# Patient Record
Sex: MALE | Race: WHITE | NOT HISPANIC OR LATINO
[De-identification: names, ages, dates, MRNs, and addresses within clinical notes are randomized per-mention and may not be internally consistent; named-entity substitution may affect disease eponyms.]

---

## 2017-03-27 ENCOUNTER — APPOINTMENT (OUTPATIENT)
Dept: OTOLARYNGOLOGY | Facility: CLINIC | Age: 67
End: 2017-03-27

## 2017-04-04 ENCOUNTER — APPOINTMENT (OUTPATIENT)
Dept: OTOLARYNGOLOGY | Facility: CLINIC | Age: 67
End: 2017-04-04

## 2017-04-04 VITALS — BODY MASS INDEX: 21.67 KG/M2 | WEIGHT: 160 LBS | HEIGHT: 72 IN

## 2017-04-04 DIAGNOSIS — H92.03 OTALGIA, BILATERAL: ICD-10-CM

## 2017-04-04 DIAGNOSIS — Z86.69 PERSONAL HISTORY OF OTHER DISEASES OF THE NERVOUS SYSTEM AND SENSE ORGANS: ICD-10-CM

## 2017-04-04 DIAGNOSIS — H93.12 TINNITUS, LEFT EAR: ICD-10-CM

## 2017-04-04 DIAGNOSIS — H92.09 OTALGIA, UNSPECIFIED EAR: ICD-10-CM

## 2017-04-04 DIAGNOSIS — M26.609 UNSPECIFIED TEMPOROMANDIBULAR JOINT DISORDER: ICD-10-CM

## 2017-04-04 DIAGNOSIS — H90.3 SENSORINEURAL HEARING LOSS, BILATERAL: ICD-10-CM

## 2017-04-04 DIAGNOSIS — K11.9 DISEASE OF SALIVARY GLAND, UNSPECIFIED: ICD-10-CM

## 2017-04-04 RX ORDER — GABAPENTIN 300 MG/1
300 CAPSULE ORAL
Qty: 90 | Refills: 2 | Status: ACTIVE | COMMUNITY
Start: 2017-04-04 | End: 1900-01-01

## 2017-04-26 ENCOUNTER — APPOINTMENT (OUTPATIENT)
Dept: MRI IMAGING | Facility: CLINIC | Age: 67
End: 2017-04-26

## 2017-04-28 DIAGNOSIS — Z82.49 FAMILY HISTORY OF ISCHEMIC HEART DISEASE AND OTHER DISEASES OF THE CIRCULATORY SYSTEM: ICD-10-CM

## 2017-06-28 PROBLEM — Z82.49 FAMILY HISTORY OF HYPERTENSION: Status: ACTIVE | Noted: 2017-04-04

## 2020-07-09 ENCOUNTER — APPOINTMENT (OUTPATIENT)
Dept: OTOLARYNGOLOGY | Facility: CLINIC | Age: 70
End: 2020-07-09
Payer: MEDICAID

## 2020-07-09 VITALS
DIASTOLIC BLOOD PRESSURE: 83 MMHG | HEART RATE: 89 BPM | SYSTOLIC BLOOD PRESSURE: 127 MMHG | TEMPERATURE: 98.3 F | OXYGEN SATURATION: 99 %

## 2020-07-09 DIAGNOSIS — H93.8X2 OTHER SPECIFIED DISORDERS OF LEFT EAR: ICD-10-CM

## 2020-07-09 DIAGNOSIS — H92.02 OTALGIA, LEFT EAR: ICD-10-CM

## 2020-07-09 DIAGNOSIS — H61.23 IMPACTED CERUMEN, BILATERAL: ICD-10-CM

## 2020-07-09 PROCEDURE — 69210 REMOVE IMPACTED EAR WAX UNI: CPT

## 2020-07-09 PROCEDURE — 99214 OFFICE O/P EST MOD 30 MIN: CPT | Mod: 25

## 2020-07-09 RX ORDER — NEOMYCIN SULFATE, POLYMYXIN B SULFATE AND HYDROCORTISONE 3.5; 10000; 1 MG/ML; [IU]/ML; MG/ML
3.5-10000-1 SOLUTION AURICULAR (OTIC) 4 TIMES DAILY
Qty: 2 | Refills: 10 | Status: ACTIVE | COMMUNITY
Start: 2020-07-09 | End: 1900-01-01

## 2020-07-09 NOTE — HISTORY OF PRESENT ILLNESS
[de-identified] : 65 years old male patient with history of Left ear discomfort, Otalgia and clogged ear for the past couple of months.   TMJ  Patient is present today in the office with Bilateral  Cerumen Impaction, Left Otitis Externa

## 2020-07-09 NOTE — PHYSICAL EXAM
[de-identified] : Lt facial nerve weakness [de-identified] : Lt facial nerve weakness [de-identified] :  Bilateral  Cerumen Impaction, Left Otitis Externa  [Normal] : mucosa is normal [Midline] : trachea located in midline position [de-identified] : Lt facial nerve weakness

## 2020-07-09 NOTE — PROCEDURE
[] : Removal of Cerumen [FreeTextEntry5] : Cerumen impaction was removed via operating head otoscope.  Mcneill suction # 5 and ear curette

## 2020-07-09 NOTE — REVIEW OF SYSTEMS
[Patient Intake Form Reviewed] : Patient intake form was reviewed [As Noted in HPI] : as noted in HPI [Ear Pain] : ear pain [Hearing Loss] : hearing loss [Ear Noises] : ear noises [Negative] : Heme/Lymph

## 2020-07-09 NOTE — REASON FOR VISIT
[Subsequent Evaluation] : a subsequent evaluation for [FreeTextEntry2] : Left ear discomfort, Otalgia and clogged ear for the past couple of months.

## 2023-08-04 ENCOUNTER — NON-APPOINTMENT (OUTPATIENT)
Age: 73
End: 2023-08-04

## 2023-08-15 ENCOUNTER — APPOINTMENT (OUTPATIENT)
Dept: UROLOGY | Facility: CLINIC | Age: 73
End: 2023-08-15
Payer: MEDICARE

## 2023-08-15 VITALS
OXYGEN SATURATION: 97 % | HEART RATE: 92 BPM | BODY MASS INDEX: 21.7 KG/M2 | TEMPERATURE: 97.7 F | SYSTOLIC BLOOD PRESSURE: 130 MMHG | WEIGHT: 160 LBS | DIASTOLIC BLOOD PRESSURE: 73 MMHG

## 2023-08-15 DIAGNOSIS — R10.9 UNSPECIFIED ABDOMINAL PAIN: ICD-10-CM

## 2023-08-15 DIAGNOSIS — R39.9 UNSPECIFIED SYMPTOMS AND SIGNS INVOLVING THE GENITOURINARY SYSTEM: ICD-10-CM

## 2023-08-15 DIAGNOSIS — R35.0 FREQUENCY OF MICTURITION: ICD-10-CM

## 2023-08-15 LAB
BILIRUB UR QL STRIP: NORMAL
CLARITY UR: CLEAR
COLLECTION METHOD: NORMAL
GLUCOSE UR-MCNC: NORMAL
HCG UR QL: 0.2 EU/DL
HGB UR QL STRIP.AUTO: NORMAL
KETONES UR-MCNC: NORMAL
LEUKOCYTE ESTERASE UR QL STRIP: ABNORMAL
NITRITE UR QL STRIP: NORMAL
PH UR STRIP: 6.5
PROT UR STRIP-MCNC: NORMAL
SP GR UR STRIP: 1.02

## 2023-08-15 PROCEDURE — 99204 OFFICE O/P NEW MOD 45 MIN: CPT | Mod: 25

## 2023-08-15 PROCEDURE — 51798 US URINE CAPACITY MEASURE: CPT

## 2023-08-15 PROCEDURE — 81003 URINALYSIS AUTO W/O SCOPE: CPT | Mod: QW

## 2023-08-15 RX ORDER — TAMSULOSIN HYDROCHLORIDE 0.4 MG/1
0.4 CAPSULE ORAL
Refills: 0 | Status: ACTIVE | COMMUNITY

## 2023-08-15 RX ORDER — OMEPRAZOLE MAGNESIUM 20 MG/1
TABLET, DELAYED RELEASE ORAL
Refills: 0 | Status: ACTIVE | COMMUNITY

## 2023-08-15 NOTE — PHYSICAL EXAM
[General Appearance - Well Developed] : well developed [General Appearance - Well Nourished] : well nourished [Normal Appearance] : normal appearance [Well Groomed] : well groomed [General Appearance - In No Acute Distress] : no acute distress [Oriented To Time, Place, And Person] : oriented to person, place, and time [Affect] : the affect was normal [Mood] : the mood was normal [Not Anxious] : not anxious [Urethral Meatus] : meatus normal [Urinary Bladder Findings] : the bladder was normal on palpation [Scrotum] : the scrotum was normal [Testes Mass (___cm)] : there were no testicular masses [Edema] : no peripheral edema [Respiration, Rhythm And Depth] : normal respiratory rhythm and effort [Exaggerated Use Of Accessory Muscles For Inspiration] : no accessory muscle use [Abdomen Soft] : soft [Abdomen Tenderness] : non-tender [Abdomen Mass (___ Cm)] : no abdominal mass palpated [Abdomen Hernia] : no hernia was discovered [Costovertebral Angle Tenderness] : no ~M costovertebral angle tenderness [FreeTextEntry1] : Rectal exam declined today. [Normal Station and Gait] : the gait and station were normal for the patient's age [] : no rash [No Focal Deficits] : no focal deficits

## 2023-08-15 NOTE — ASSESSMENT
[FreeTextEntry1] : We discussed his prior records at length. The etiology of his left abdominal pain is unclear. We discussed the significance of monitoring his PSAs, especially in view of his prior history of abnormal MRI scan. I have asked that he send his prior PSAs for my review. I advised that if he has not had a PSA test this year, he can come to our office to have that drawn after 3 days of abstinence. Regarding his moderate LUTS, he will continue on the tamsulosin 0.4 mg. I re-reviewed the side effects of the medication. A urine culture is pending. We also discussed empiric increase of tamsulosin to 0.8 mg daily and he will consider this. We also discussed further testing  while he is more symptomatic and he will also consider this alternative. Martine Hancock MD

## 2023-08-15 NOTE — HISTORY OF PRESENT ILLNESS
[FreeTextEntry1] : Patient seen TODAY 8/15/2023 as NPT. Approximately 2 years ago, he experienced intermittent left abdominal pain, which continues to persist but has remained stable. A renal US in 10/2021 was unremarkable (see below). He was recently evaluated by another urologist a few months ago, but was lost to follow up.   From his general urologic history, he has moderate LUTS, with nocturia x1-2. He denies dysuria. He has been on tamsulosin 0.4 mg daily, with good response.   Additionally, he has a history of elevated PSAs (no records available). A prostate MRI in 2022 identified a PIRADs 4 lesion. A prostate biopsy at [facility] on 2/22/22, which was negative. His most recent PSA is reportedly normal (no records available)  UA trace WBC IPSS 17 ROBYN 19 JOAN 5 PVR 25 cc  Cre: 3/27/23--0.91  Renal sono: 10/2021--bilateral renal cysts, no stones, prostate 60 cc Prostate biopsy: 2/22/22--negative

## 2023-08-17 ENCOUNTER — TRANSCRIPTION ENCOUNTER (OUTPATIENT)
Age: 73
End: 2023-08-17

## 2023-08-17 ENCOUNTER — NON-APPOINTMENT (OUTPATIENT)
Age: 73
End: 2023-08-17

## 2023-08-17 LAB — BACTERIA UR CULT: NORMAL

## 2023-09-14 ENCOUNTER — NON-APPOINTMENT (OUTPATIENT)
Age: 73
End: 2023-09-14

## 2023-09-14 ENCOUNTER — APPOINTMENT (OUTPATIENT)
Dept: HEART AND VASCULAR | Facility: CLINIC | Age: 73
End: 2023-09-14
Payer: MEDICARE

## 2023-09-14 VITALS
OXYGEN SATURATION: 97 % | SYSTOLIC BLOOD PRESSURE: 122 MMHG | DIASTOLIC BLOOD PRESSURE: 70 MMHG | HEIGHT: 72 IN | HEART RATE: 90 BPM | BODY MASS INDEX: 22.08 KG/M2 | WEIGHT: 163 LBS | TEMPERATURE: 98.2 F

## 2023-09-14 DIAGNOSIS — H53.9 UNSPECIFIED VISUAL DISTURBANCE: ICD-10-CM

## 2023-09-14 PROCEDURE — 36415 COLL VENOUS BLD VENIPUNCTURE: CPT

## 2023-09-14 PROCEDURE — 99204 OFFICE O/P NEW MOD 45 MIN: CPT | Mod: 25

## 2023-09-14 PROCEDURE — 93000 ELECTROCARDIOGRAM COMPLETE: CPT

## 2023-09-14 RX ORDER — AMLODIPINE BESYLATE 5 MG/1
5 TABLET ORAL DAILY
Refills: 0 | Status: ACTIVE | COMMUNITY

## 2023-09-14 RX ORDER — LOSARTAN POTASSIUM 100 MG/1
100 TABLET, FILM COATED ORAL DAILY
Qty: 1 | Refills: 3 | Status: ACTIVE | COMMUNITY
Start: 2023-09-14

## 2023-09-18 ENCOUNTER — TRANSCRIPTION ENCOUNTER (OUTPATIENT)
Age: 73
End: 2023-09-18

## 2023-09-18 DIAGNOSIS — I10 ESSENTIAL (PRIMARY) HYPERTENSION: ICD-10-CM

## 2023-09-18 LAB
ALBUMIN SERPL ELPH-MCNC: 4.9 G/DL
ALP BLD-CCNC: 86 U/L
ALT SERPL-CCNC: 12 U/L
ANION GAP SERPL CALC-SCNC: 11 MMOL/L
AST SERPL-CCNC: 14 U/L
BILIRUB SERPL-MCNC: 1 MG/DL
BUN SERPL-MCNC: 22 MG/DL
CALCIUM SERPL-MCNC: 9.9 MG/DL
CHLORIDE SERPL-SCNC: 106 MMOL/L
CHOLEST SERPL-MCNC: 188 MG/DL
CO2 SERPL-SCNC: 24 MMOL/L
CREAT SERPL-MCNC: 0.88 MG/DL
CREAT SPEC-SCNC: 152 MG/DL
EGFR: 91 ML/MIN/1.73M2
ESTIMATED AVERAGE GLUCOSE: 88 MG/DL
GLUCOSE SERPL-MCNC: 102 MG/DL
HBA1C MFR BLD HPLC: 4.7 %
HDLC SERPL-MCNC: 50 MG/DL
LDLC SERPL CALC-MCNC: 116 MG/DL
MICROALBUMIN 24H UR DL<=1MG/L-MCNC: 1.3 MG/DL
MICROALBUMIN/CREAT 24H UR-RTO: 9 MG/G
NONHDLC SERPL-MCNC: 137 MG/DL
POTASSIUM SERPL-SCNC: 4.7 MMOL/L
PROT SERPL-MCNC: 7.1 G/DL
SODIUM SERPL-SCNC: 141 MMOL/L
TRIGL SERPL-MCNC: 118 MG/DL

## 2023-09-18 RX ORDER — ROSUVASTATIN CALCIUM 10 MG/1
10 TABLET, FILM COATED ORAL
Qty: 90 | Refills: 2 | Status: ACTIVE | COMMUNITY
Start: 2023-09-18 | End: 1900-01-01

## 2023-09-22 ENCOUNTER — TRANSCRIPTION ENCOUNTER (OUTPATIENT)
Age: 73
End: 2023-09-22

## 2023-09-28 ENCOUNTER — APPOINTMENT (OUTPATIENT)
Dept: HEART AND VASCULAR | Facility: CLINIC | Age: 73
End: 2023-09-28
Payer: MEDICARE

## 2023-09-28 PROCEDURE — 93306 TTE W/DOPPLER COMPLETE: CPT

## 2023-10-02 ENCOUNTER — TRANSCRIPTION ENCOUNTER (OUTPATIENT)
Age: 73
End: 2023-10-02

## 2023-10-25 ENCOUNTER — APPOINTMENT (OUTPATIENT)
Dept: HEART AND VASCULAR | Facility: CLINIC | Age: 73
End: 2023-10-25

## 2024-03-19 ENCOUNTER — APPOINTMENT (OUTPATIENT)
Dept: OTOLARYNGOLOGY | Facility: CLINIC | Age: 74
End: 2024-03-19

## 2024-04-03 ENCOUNTER — APPOINTMENT (OUTPATIENT)
Dept: DERMATOLOGY | Facility: CLINIC | Age: 74
End: 2024-04-03
Payer: MEDICARE

## 2024-04-03 DIAGNOSIS — L30.9 DERMATITIS, UNSPECIFIED: ICD-10-CM

## 2024-04-03 PROCEDURE — 17110 DESTRUCTION B9 LES UP TO 14: CPT

## 2024-04-03 PROCEDURE — 99204 OFFICE O/P NEW MOD 45 MIN: CPT | Mod: 25

## 2024-04-03 RX ORDER — TRIAMCINOLONE ACETONIDE 1 MG/G
0.1 CREAM TOPICAL
Qty: 1 | Refills: 1 | Status: ACTIVE | COMMUNITY
Start: 2024-04-03 | End: 1900-01-01

## 2024-04-03 NOTE — ASSESSMENT
[FreeTextEntry1] : #Seborrheic Keratoses, clinically inflamed Natural history and treatment options discussed. Recommend treatment with cryotherapy to which the patient is agreeable.  Cryotherapy Procedure Note. Lesion(s) treated: 3, Location: vertex scalp After obtaining verbal consent, including counseling on risks of dyspigmentation and scarring, liquid nitrogen was applied to the above lesions. The patient tolerated the procedure well. Wound care was reviewed.  #Dermatitis, chronic - active #Possible PIE -moisturizer daily with emollient such as aquaphor or vaseline -triamcinolone 0.1 cream BID for 2 weeks on 1 week off as needed for itching -rtc if not improved after 4-6 weeks. If there is evidence of PIE without inflammation, can consider treating with PDL  RTC prn

## 2024-04-03 NOTE — HISTORY OF PRESENT ILLNESS
[de-identified] : Chetan Swan is a 74 years old patient who presents for the above concerns.  1) lesions scalp, get itchy and inflamed with hair combing. Treated w/ LN2 years ago at [place], with good results. coming back now.  2) redness and itching on upper back x years. Same area as prior inflamed cyst, however cyst is not present anymore.   No hx skin ca No Fhx skin ca [FreeTextEntry1] : NPV- scalp lesions, redness on back

## 2024-04-03 NOTE — PHYSICAL EXAM
[Alert] : alert [Oriented x 3] : ~L oriented x 3 [Well Nourished] : well nourished [Conjunctiva Non-injected] : conjunctiva non-injected [No Visual Lymphadenopathy] : no visual  lymphadenopathy [No Clubbing] : no clubbing [No Edema] : no edema [No Bromhidrosis] : no bromhidrosis [No Chromhidrosis] : no chromhidrosis [Scalp] : Scalp [Face] : Face [Back] : Back [FreeTextEntry3] : On the vertex scalp are 3x irritated stuck-on appearing papules with pseudohorn cysts.  central upper back - erythematous slightly scaly ill defined thin plaque

## 2024-04-05 ENCOUNTER — APPOINTMENT (OUTPATIENT)
Dept: ORTHOPEDIC SURGERY | Facility: CLINIC | Age: 74
End: 2024-04-05
Payer: MEDICARE

## 2024-04-05 VITALS
OXYGEN SATURATION: 98 % | HEART RATE: 82 BPM | SYSTOLIC BLOOD PRESSURE: 123 MMHG | HEIGHT: 72 IN | DIASTOLIC BLOOD PRESSURE: 79 MMHG | BODY MASS INDEX: 22.08 KG/M2 | TEMPERATURE: 97.2 F | WEIGHT: 163 LBS

## 2024-04-05 DIAGNOSIS — M53.3 SACROCOCCYGEAL DISORDERS, NOT ELSEWHERE CLASSIFIED: ICD-10-CM

## 2024-04-05 PROCEDURE — 99205 OFFICE O/P NEW HI 60 MIN: CPT

## 2024-04-05 NOTE — HISTORY OF PRESENT ILLNESS
[de-identified] : 73-year-old male presents as new patient for evaluation of left-sided posterior pelvic pain.  States this began in 2017 atraumatically and has been intermittent though steadily progressive since that time.  Initially saw spinal surgeon at [place] who recommended a fusion due to some degenerative disc disease.  He has seen Dr. Perkins and undergone 3 epidural steroid injections last year which did provide him some relief though temporarily.  The pain is located in the left side lateral to the PSIS along iliac crest into the buttock musculature.  Denies any midline low back pain.  Denies any radiating pain numbness weakness or tingling in extremities.  This pain is worse when he is bending leaning forward or getting up from seated position

## 2024-04-05 NOTE — DISCUSSION/SUMMARY
[de-identified] : I reviewed the imaging with the patient and do not feel that there is an organic structural cause of his current pain given the entirety of the nerve roots and facet joints specially distal lumbar spine.  There are some minimal degeneration at L2-3 however I do not feel that he has symptoms from this.  I have not recommend any spinal surgery.  We discussed physical therapy as well as a possible SI joint injection on the left side which she will discuss with his pain management physician Dr. Perkins.  This would be for both diagnostic and therapeutic benefit.  He will keep us informed of his progress  I have spent greater than 60 minutes preparing to see the patient, collecting relevant history, performing a thorough history and physical examination, counseling the patient regarding my findings ordering the appropriate therapies and tests, communicating with other relevant healthcare professionals, documenting my encounter and coordinating care.

## 2024-04-05 NOTE — ASSESSMENT
[FreeTextEntry1] : 74-year-old male with mechanical left-sided posterior lateral pelvic pain likely secondary to SI joint

## 2024-04-05 NOTE — PHYSICAL EXAM
[UE/LE] : Sensory: Intact in bilateral upper & lower extremities [Knee] : patellar 2+ and symmetric bilaterally [Ankle] : ankle 2+ and symmetric bilaterally [Normal Touch] : sensation intact for touch [Normal Proprioception] : sensation intact for proprioception [Normal] : No costovertebral angle tenderness and no spinal tenderness [de-identified] : X-ray and MRI lumbar spine October 2023 reviewed on the [facility] portal: Some degenerative disc disease at L2-3 with no central or foraminal stenosis.  The distal levels appear without significant degenerative change

## 2024-05-15 ENCOUNTER — APPOINTMENT (OUTPATIENT)
Dept: DERMATOLOGY | Facility: CLINIC | Age: 74
End: 2024-05-15
Payer: MEDICARE

## 2024-05-15 DIAGNOSIS — L82.0 INFLAMED SEBORRHEIC KERATOSIS: ICD-10-CM

## 2024-05-15 PROCEDURE — 17110 DESTRUCTION B9 LES UP TO 14: CPT

## 2024-05-15 NOTE — ASSESSMENT
[FreeTextEntry1] : #Seborrheic Keratoses, clinically inflamed Natural history and treatment options discussed. Recommend treatment with cryotherapy to which the patient is agreeable.   Cryotherapy Procedure Note. Lesion(s) / Location treated:1 , vertex scalp  After obtaining verbal consent, including counseling on risks of dyspigmentation and scarring, liquid nitrogen was applied to the above lesions x 3 cycles. The patient tolerated the procedure well. Wound care was reviewed.  RTC prn

## 2024-05-15 NOTE — PHYSICAL EXAM
[Alert] : alert [Oriented x 3] : ~L oriented x 3 [Well Nourished] : well nourished [Conjunctiva Non-injected] : conjunctiva non-injected [No Visual Lymphadenopathy] : no visual  lymphadenopathy [No Clubbing] : no clubbing [No Edema] : no edema [No Bromhidrosis] : no bromhidrosis [No Chromhidrosis] : no chromhidrosis [Scalp] : Scalp [Face] : Face [Back] : Back [FreeTextEntry3] : On the vertex scalp is 1x irritated stuck-on appearing thin papules with pseudohorn cysts.

## 2024-05-15 NOTE — HISTORY OF PRESENT ILLNESS
[FreeTextEntry1] : RPV- scalp lesions [de-identified] : Chetan Swan is a 74 years old M follow up.   ISK on scalp improved but 1 still there. Still symptomatic. Wants retreatment today.

## 2024-09-24 ENCOUNTER — APPOINTMENT (OUTPATIENT)
Dept: OTOLARYNGOLOGY | Facility: CLINIC | Age: 74
End: 2024-09-24
Payer: MEDICARE

## 2024-09-24 VITALS
DIASTOLIC BLOOD PRESSURE: 76 MMHG | HEART RATE: 87 BPM | HEIGHT: 72 IN | BODY MASS INDEX: 21.4 KG/M2 | WEIGHT: 158 LBS | SYSTOLIC BLOOD PRESSURE: 138 MMHG | TEMPERATURE: 97.6 F

## 2024-09-24 DIAGNOSIS — H90.3 SENSORINEURAL HEARING LOSS, BILATERAL: ICD-10-CM

## 2024-09-24 DIAGNOSIS — Z86.69 PERSONAL HISTORY OF OTHER DISEASES OF THE NERVOUS SYSTEM AND SENSE ORGANS: ICD-10-CM

## 2024-09-24 DIAGNOSIS — R10.9 UNSPECIFIED ABDOMINAL PAIN: ICD-10-CM

## 2024-09-24 DIAGNOSIS — H93.13 TINNITUS, BILATERAL: ICD-10-CM

## 2024-09-24 DIAGNOSIS — H92.09 OTALGIA, UNSPECIFIED EAR: ICD-10-CM

## 2024-09-24 DIAGNOSIS — H93.8X3 OTHER SPECIFIED DISORDERS OF EAR, BILATERAL: ICD-10-CM

## 2024-09-24 DIAGNOSIS — H61.23 IMPACTED CERUMEN, BILATERAL: ICD-10-CM

## 2024-09-24 DIAGNOSIS — R39.9 UNSPECIFIED SYMPTOMS AND SIGNS INVOLVING THE GENITOURINARY SYSTEM: ICD-10-CM

## 2024-09-24 DIAGNOSIS — R19.8 OTHER SPECIFIED SYMPTOMS AND SIGNS INVOLVING THE DIGESTIVE SYSTEM AND ABDOMEN: ICD-10-CM

## 2024-09-24 DIAGNOSIS — H93.8X2 OTHER SPECIFIED DISORDERS OF LEFT EAR: ICD-10-CM

## 2024-09-24 DIAGNOSIS — Z87.438 PERSONAL HISTORY OF OTHER DISEASES OF MALE GENITAL ORGANS: ICD-10-CM

## 2024-09-24 DIAGNOSIS — Z09 ENCOUNTER FOR FOLLOW-UP EXAMINATION AFTER COMPLETED TREATMENT FOR CONDITIONS OTHER THAN MALIGNANT NEOPLASM: ICD-10-CM

## 2024-09-24 DIAGNOSIS — H93.12 TINNITUS, LEFT EAR: ICD-10-CM

## 2024-09-24 DIAGNOSIS — M26.609 UNSPECIFIED TEMPOROMANDIBULAR JOINT DISORDER: ICD-10-CM

## 2024-09-24 DIAGNOSIS — K11.8 OTHER DISEASES OF SALIVARY GLANDS: ICD-10-CM

## 2024-09-24 DIAGNOSIS — H92.02 OTALGIA, LEFT EAR: ICD-10-CM

## 2024-09-24 PROCEDURE — 92567 TYMPANOMETRY: CPT

## 2024-09-24 PROCEDURE — 92557 COMPREHENSIVE HEARING TEST: CPT

## 2024-09-24 PROCEDURE — 99204 OFFICE O/P NEW MOD 45 MIN: CPT

## 2024-09-24 RX ORDER — TAMSULOSIN HYDROCHLORIDE 0.4 MG/1
0.4 CAPSULE ORAL
Refills: 0 | Status: ACTIVE | COMMUNITY

## 2024-09-24 RX ORDER — ATORVASTATIN CALCIUM 10 MG/1
10 TABLET, FILM COATED ORAL
Refills: 0 | Status: ACTIVE | COMMUNITY

## 2024-09-24 NOTE — HISTORY OF PRESENT ILLNESS
[de-identified] : Mr. WHITE is a 74-year-old man who was seen for several ENT problems. 1.) R > L tinnitus, nonpulsatile, intermittent, not always at same time, more often occurs when he wakes up, for over 10 years.  The tinnitus does not interfere with his daily life.  Tried gabapentin for back pain in past (Dr. Tijerina had prescribed for tinnitus in 2017) Hx of asymmetric SNHL, worse on left side.  MRI Brain with and without contrast was negative for IAC/CPA lesion in 2015 and 2016. He is not sure if hearing has changed. No significant loud noise exposure.  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 2.) Ear popping intermittent, and bilateral ear clogging over several months.  No ear pain or d/c No URI, postnasal drip or nasal congestion.  No reflux. 3.) chronic intermittent swelling behind left jaw angle for over 10 years, but happening more often, 2-3x/month, in the past year.  Swelling is accompanied by pain, can last hours to days; improved with hot compresses.  Last swelling occurred a few days ago.   Hx of Bell's palsy x 3 (at age 19yo.; 20 years ago, 10 years ago), with full recovery of facial function.  Same side was affected each time, on left side he thinks. MRI brain +/- contrast (4/2015) noted abnormal signal in the left parotid tail, with enhancement after contrast. In 2017, Dr. Tijerina ordered another MRI neck, which was not done.

## 2024-09-24 NOTE — ASSESSMENT
[FreeTextEntry1] : Mr. WHITE is a 74-year-old man who was evaluated for the following issues today:   1.) Right > Left tinnitus, chronic intermittent, nonpulsatile and most often noted when he wakes from sleep. Tinnitus more likely noticeable due to teeth clenching, possible grinding, than from his SNHL. He was recommended to have nightguard by dentist in the past.   --> try OTC nightguard.  May need to have custom nighguard made.   2.) bilateral SNHL, progressive and with minimal asymmetry now. He doesnt feel that he needs hearing aids at this time.   3.) Ear clogging and popping relieved after removal of bilateral cerumen impactions. His ear canal hair is mixed with the wax, which leads to the impactions. 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 MRI in 2025 with enhancement in left tail of parotid --> MRI neck with and without contrast to reassess parotid  Telephone f/u in a month Return to office in 6 months for the wax.

## 2024-09-24 NOTE — REVIEW OF SYSTEMS
[Patient Intake Form Reviewed] : Patient intake form was reviewed [As Noted in HPI] : as noted in HPI [Feeling Tired] : feeling tired [Hesitancy] : urinary hesitancy [Negative] : Heme/Lymph

## 2024-09-24 NOTE — HISTORY OF PRESENT ILLNESS
[de-identified] : Mr. WHITE is a 74-year-old man who was seen for several ENT problems. 1.) R > L tinnitus, nonpulsatile, intermittent, not always at same time, more often occurs when he wakes up, for over 10 years.  The tinnitus does not interfere with his daily life.  Tried gabapentin for back pain in past (Dr. Tijerina had prescribed for tinnitus in 2017) Hx of asymmetric SNHL, worse on left side.  MRI Brain with and without contrast was negative for IAC/CPA lesion in 2015 and 2016. He is not sure if hearing has changed. No significant loud noise exposure.  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  No ear pain or d/c No URI, postnasal drip or nasal congestion.  No reflux. 3.) chronic intermittent swelling behind left jaw angle for over 10 years, but happening more often, 2-3x/month, in the past year.  Swelling is accompanied by pain, can last hours to days; improved with hot compresses.  Last swelling occurred a few days ago.   Hx of Bell's palsy x 3 (at age 19yo.; 20 years ago, 10 years ago), with full recovery of facial function.  Same side was affected each time, on left side he thinks. MRI brain +/- contrast (4/2015) noted abnormal signal in the left parotid tail, with enhancement after contrast. In 2017, Dr. Tijerina ordered another MRI neck, which was not done.

## 2024-09-24 NOTE — DATA REVIEWED
[de-identified] :   AUDIOGRAM (09/24/2024) RIGHT:  Mild to severe SNHL LEFT:   Mild to moderately severe SNHL Tympanograms A  bilateral    Word recognition 90% on right; 100% on left   AUDIOGRAM (4/14/2016) RIGHT:  Hearing within normal limits except for mild SNHL at 3-4K Hz..     LEFT:   Hearing within normal limits except for mild SNHL at 250 Hz and at 3-6K Hz. Tympanograms A  bilateral   Word recognition 100% bilateral     AUDIOGRAM (11/24/2014) RIGHT:  Hearing within normal limits.     LEFT:   Mild conductive hearing loss at 250 Hz, rising to within normal limits through 1.5K Hz, falling to mild SNHL through 6K Hz, then rising to WNL Tympanograms A  bilateral   Word recognition 100% bilateral

## 2024-09-24 NOTE — PHYSICAL EXAM
[] : septum deviated to the right [Midline] : trachea located in midline position [Normal] : temporomandibular joint is normal [FreeTextEntry1] : No hoarseness.  [de-identified] : No mass palpable in left parotid or upper neck. [de-identified] : Thyroid gland normal size, no palpable nodules.  [de-identified] : Bilateral EAC impacted cerumen mixed with hair, removed with suction and forceps after H2O2 instillation. [de-identified] : Carotid pulses 2+ bilateral.

## 2024-09-24 NOTE — PHYSICAL EXAM
[] : septum deviated to the right [Midline] : trachea located in midline position [Normal] : temporomandibular joint is normal [FreeTextEntry1] : No hoarseness.  [de-identified] : No mass palpable in left parotid or upper neck. [de-identified] : Thyroid gland normal size, no palpable nodules.  [de-identified] : Bilateral EAC impacted cerumen mixed with hair, removed with suction and forceps after H2O2 instillation. [de-identified] : Carotid pulses 2+ bilateral.

## 2024-09-24 NOTE — DATA REVIEWED
[de-identified] :   AUDIOGRAM (09/24/2024) RIGHT:  Mild to severe SNHL LEFT:   Mild to moderately severe SNHL Tympanograms A  bilateral    Word recognition 90% on right; 100% on left   AUDIOGRAM (4/14/2016) RIGHT:  Hearing within normal limits except for mild SNHL at 3-4K Hz..     LEFT:   Hearing within normal limits except for mild SNHL at 250 Hz and at 3-6K Hz. Tympanograms A  bilateral   Word recognition 100% bilateral     AUDIOGRAM (11/24/2014) RIGHT:  Hearing within normal limits.     LEFT:   Mild conductive hearing loss at 250 Hz, rising to within normal limits through 1.5K Hz, falling to mild SNHL through 6K Hz, then rising to WNL Tympanograms A  bilateral   Word recognition 100% bilateral

## 2024-09-25 ENCOUNTER — APPOINTMENT (OUTPATIENT)
Dept: OTOLARYNGOLOGY | Facility: CLINIC | Age: 74
End: 2024-09-25

## 2024-10-01 ENCOUNTER — APPOINTMENT (OUTPATIENT)
Dept: OTOLARYNGOLOGY | Facility: CLINIC | Age: 74
End: 2024-10-01

## 2024-10-02 ENCOUNTER — APPOINTMENT (OUTPATIENT)
Dept: ORTHOPEDIC SURGERY | Age: 74
End: 2024-10-02
Payer: MEDICARE

## 2024-10-02 VITALS
WEIGHT: 158 LBS | SYSTOLIC BLOOD PRESSURE: 126 MMHG | DIASTOLIC BLOOD PRESSURE: 73 MMHG | TEMPERATURE: 98.3 F | BODY MASS INDEX: 21.4 KG/M2 | HEIGHT: 72 IN | OXYGEN SATURATION: 95 %

## 2024-10-02 DIAGNOSIS — M53.3 SACROCOCCYGEAL DISORDERS, NOT ELSEWHERE CLASSIFIED: ICD-10-CM

## 2024-10-02 PROCEDURE — 72100 X-RAY EXAM L-S SPINE 2/3 VWS: CPT

## 2024-10-02 PROCEDURE — 99215 OFFICE O/P EST HI 40 MIN: CPT | Mod: 25

## 2024-10-03 NOTE — HISTORY OF PRESENT ILLNESS
[de-identified] : Returns today regarding chronic left lateral hip and low back pain.  Since I last saw him his pain has been consistent without change in location or character.  Still located laterally along the iliac crest on the left side.  This is worse with certain positions or activities and has mechanical nature.  He has undergone an SI joint injection with Dr. Perkins which provided him significant relief for approximately 1 month.

## 2024-10-03 NOTE — DISCUSSION/SUMMARY
[de-identified] : We reviewed today the multiple tensional causes for his ongoing pain.  He understands that this location is somewhat atypical for radiating pain from the facet or SI joint or lumbar radiculopathy however all of these should be considered.  I recommended ongoing physical therapy and activity modification with consideration of additional interventional procedures.  He presently has an ablation scheduled with Dr. Perkins in May also benefit from a denervation to the left-sided SI joint for both therapeutic and diagnostic purposes.  Will plan to connect with Dr. Perkins regarding his ongoing care and Mr. Swan will keep us informed of his progress   I have spent greater than 45 minutes preparing to see the patient, collecting relevant history, performing a thorough history and physical examination, counseling the patient regarding my findings ordering the appropriate therapies and tests, communicating with other relevant healthcare professionals, documenting my encounter and coordinating care.

## 2024-10-03 NOTE — HISTORY OF PRESENT ILLNESS
[de-identified] : Returns today regarding chronic left lateral hip and low back pain.  Since I last saw him his pain has been consistent without change in location or character.  Still located laterally along the iliac crest on the left side.  This is worse with certain positions or activities and has mechanical nature.  He has undergone an SI joint injection with Dr. Perkins which provided him significant relief for approximately 1 month.

## 2024-10-03 NOTE — PHYSICAL EXAM
[UE/LE] : Sensory: Intact in bilateral upper & lower extremities [Knee] : patellar 2+ and symmetric bilaterally [Ankle] : ankle 2+ and symmetric bilaterally [Normal Touch] : sensation intact for touch [Normal Proprioception] : sensation intact for proprioception [Normal] : No costovertebral angle tenderness and no spinal tenderness [de-identified] : AP lateral lumbar spine x-ray PACS 10/3/2024 Sagittal and coronal alignment maintained.  Minimal degenerative disease with mild retrolisthesis and loss of disc base at L2-3.  Prior MRI completed at [place] last year not available for review today

## 2024-10-03 NOTE — PHYSICAL EXAM
[UE/LE] : Sensory: Intact in bilateral upper & lower extremities [Knee] : patellar 2+ and symmetric bilaterally [Ankle] : ankle 2+ and symmetric bilaterally [Normal Touch] : sensation intact for touch [Normal Proprioception] : sensation intact for proprioception [Normal] : No costovertebral angle tenderness and no spinal tenderness [de-identified] : AP lateral lumbar spine x-ray PACS 10/3/2024 Sagittal and coronal alignment maintained.  Minimal degenerative disease with mild retrolisthesis and loss of disc base at L2-3.  Prior MRI completed at [place] last year not available for review today

## 2024-10-03 NOTE — DISCUSSION/SUMMARY
[de-identified] : We reviewed today the multiple tensional causes for his ongoing pain.  He understands that this location is somewhat atypical for radiating pain from the facet or SI joint or lumbar radiculopathy however all of these should be considered.  I recommended ongoing physical therapy and activity modification with consideration of additional interventional procedures.  He presently has an ablation scheduled with Dr. Perkins in May also benefit from a denervation to the left-sided SI joint for both therapeutic and diagnostic purposes.  Will plan to connect with Dr. Perkins regarding his ongoing care and Mr. Swan will keep us informed of his progress   I have spent greater than 45 minutes preparing to see the patient, collecting relevant history, performing a thorough history and physical examination, counseling the patient regarding my findings ordering the appropriate therapies and tests, communicating with other relevant healthcare professionals, documenting my encounter and coordinating care.

## 2024-10-07 ENCOUNTER — APPOINTMENT (OUTPATIENT)
Dept: MRI IMAGING | Facility: CLINIC | Age: 74
End: 2024-10-07

## 2024-10-09 ENCOUNTER — RESULT REVIEW (OUTPATIENT)
Age: 74
End: 2024-10-09

## 2024-10-09 ENCOUNTER — APPOINTMENT (OUTPATIENT)
Dept: MRI IMAGING | Facility: CLINIC | Age: 74
End: 2024-10-09

## 2024-10-09 ENCOUNTER — OUTPATIENT (OUTPATIENT)
Dept: OUTPATIENT SERVICES | Facility: HOSPITAL | Age: 74
LOS: 1 days | End: 2024-10-09

## 2024-10-09 PROCEDURE — 70543 MRI ORBT/FAC/NCK W/O &W/DYE: CPT | Mod: 26

## 2024-10-14 ENCOUNTER — NON-APPOINTMENT (OUTPATIENT)
Age: 74
End: 2024-10-14

## 2024-10-18 ENCOUNTER — APPOINTMENT (OUTPATIENT)
Dept: ULTRASOUND IMAGING | Facility: HOSPITAL | Age: 74
End: 2024-10-18
Payer: MEDICARE

## 2024-10-18 ENCOUNTER — OUTPATIENT (OUTPATIENT)
Dept: OUTPATIENT SERVICES | Facility: HOSPITAL | Age: 74
LOS: 1 days | End: 2024-10-18

## 2024-10-18 PROCEDURE — 76536 US EXAM OF HEAD AND NECK: CPT | Mod: 26

## 2024-10-18 PROCEDURE — 76536 US EXAM OF HEAD AND NECK: CPT

## 2024-10-25 ENCOUNTER — APPOINTMENT (OUTPATIENT)
Dept: OTOLARYNGOLOGY | Facility: CLINIC | Age: 74
End: 2024-10-25
Payer: MEDICARE

## 2024-10-25 DIAGNOSIS — K11.8 OTHER DISEASES OF SALIVARY GLANDS: ICD-10-CM

## 2024-10-25 PROCEDURE — 99441: CPT | Mod: 93

## 2024-11-22 ENCOUNTER — APPOINTMENT (OUTPATIENT)
Dept: ULTRASOUND IMAGING | Facility: HOSPITAL | Age: 74
End: 2024-11-22

## 2024-11-22 ENCOUNTER — APPOINTMENT (OUTPATIENT)
Dept: HEART AND VASCULAR | Facility: CLINIC | Age: 74
End: 2024-11-22

## 2025-02-21 ENCOUNTER — APPOINTMENT (OUTPATIENT)
Dept: ORTHOPEDIC SURGERY | Facility: CLINIC | Age: 75
End: 2025-02-21

## 2025-02-25 ENCOUNTER — NON-APPOINTMENT (OUTPATIENT)
Age: 75
End: 2025-02-25

## 2025-02-26 ENCOUNTER — APPOINTMENT (OUTPATIENT)
Dept: OTOLARYNGOLOGY | Facility: CLINIC | Age: 75
End: 2025-02-26

## 2025-02-26 ENCOUNTER — NON-APPOINTMENT (OUTPATIENT)
Age: 75
End: 2025-02-26

## 2025-02-26 VITALS
HEIGHT: 72 IN | TEMPERATURE: 97.7 F | SYSTOLIC BLOOD PRESSURE: 122 MMHG | HEART RATE: 81 BPM | DIASTOLIC BLOOD PRESSURE: 75 MMHG | BODY MASS INDEX: 21.81 KG/M2 | WEIGHT: 161 LBS

## 2025-02-26 DIAGNOSIS — H93.8X3 OTHER SPECIFIED DISORDERS OF EAR, BILATERAL: ICD-10-CM

## 2025-02-26 DIAGNOSIS — K11.8 OTHER DISEASES OF SALIVARY GLANDS: ICD-10-CM

## 2025-02-26 DIAGNOSIS — H93.13 TINNITUS, BILATERAL: ICD-10-CM

## 2025-02-26 DIAGNOSIS — H90.3 SENSORINEURAL HEARING LOSS, BILATERAL: ICD-10-CM

## 2025-02-26 PROCEDURE — 99214 OFFICE O/P EST MOD 30 MIN: CPT | Mod: 25

## 2025-02-26 RX ORDER — PREGABALIN 300 MG/1
CAPSULE ORAL
Refills: 0 | Status: ACTIVE | COMMUNITY

## 2025-03-18 ENCOUNTER — APPOINTMENT (OUTPATIENT)
Dept: UROLOGY | Facility: CLINIC | Age: 75
End: 2025-03-18
Payer: MEDICARE

## 2025-03-18 ENCOUNTER — NON-APPOINTMENT (OUTPATIENT)
Age: 75
End: 2025-03-18

## 2025-03-18 VITALS
HEART RATE: 101 BPM | BODY MASS INDEX: 21.81 KG/M2 | HEIGHT: 72 IN | SYSTOLIC BLOOD PRESSURE: 119 MMHG | WEIGHT: 161 LBS | DIASTOLIC BLOOD PRESSURE: 74 MMHG | TEMPERATURE: 97.6 F

## 2025-03-18 DIAGNOSIS — R35.0 FREQUENCY OF MICTURITION: ICD-10-CM

## 2025-03-18 DIAGNOSIS — N13.8 BENIGN PROSTATIC HYPERPLASIA WITH LOWER URINARY TRACT SYMPMS: ICD-10-CM

## 2025-03-18 DIAGNOSIS — N28.1 CYST OF KIDNEY, ACQUIRED: ICD-10-CM

## 2025-03-18 DIAGNOSIS — N40.1 BENIGN PROSTATIC HYPERPLASIA WITH LOWER URINARY TRACT SYMPMS: ICD-10-CM

## 2025-03-18 DIAGNOSIS — R97.20 ELEVATED PROSTATE, SPECIFIC ANTIGEN [PSA]: ICD-10-CM

## 2025-03-18 PROCEDURE — 99214 OFFICE O/P EST MOD 30 MIN: CPT | Mod: 25

## 2025-03-18 PROCEDURE — 51798 US URINE CAPACITY MEASURE: CPT

## 2025-03-20 LAB
APPEARANCE: ABNORMAL
BACTERIA: NEGATIVE /HPF
BILIRUBIN URINE: ABNORMAL
BLOOD URINE: NEGATIVE
CAST: 22 /LPF
COLOR: NORMAL
EPITHELIAL CELLS: 4 /HPF
GLUCOSE QUALITATIVE U: NEGATIVE MG/DL
HYALINE CASTS: PRESENT
KETONES URINE: ABNORMAL MG/DL
LEUKOCYTE ESTERASE URINE: ABNORMAL
MICROSCOPIC-UA: NORMAL
MUCUS: PRESENT
NITRITE URINE: POSITIVE
PH URINE: 5
PROTEIN URINE: 30 MG/DL
RED BLOOD CELLS URINE: NORMAL /HPF
REVIEW: NORMAL
SPECIFIC GRAVITY URINE: 1.02
UROBILINOGEN URINE: 1 MG/DL
WBC CLUMPS: PRESENT
WHITE BLOOD CELLS URINE: 41 /HPF

## 2025-03-24 DIAGNOSIS — N39.0 URINARY TRACT INFECTION, SITE NOT SPECIFIED: ICD-10-CM

## 2025-03-24 LAB — BACTERIA UR CULT: ABNORMAL

## 2025-03-24 RX ORDER — AMOXICILLIN AND CLAVULANATE POTASSIUM 875; 125 MG/1; MG/1
875-125 TABLET, COATED ORAL
Qty: 14 | Refills: 0 | Status: ACTIVE | COMMUNITY
Start: 2025-03-24 | End: 1900-01-01

## 2025-04-09 ENCOUNTER — OUTPATIENT (OUTPATIENT)
Dept: OUTPATIENT SERVICES | Facility: HOSPITAL | Age: 75
LOS: 1 days | End: 2025-04-09

## 2025-04-09 ENCOUNTER — APPOINTMENT (OUTPATIENT)
Dept: MRI IMAGING | Facility: CLINIC | Age: 75
End: 2025-04-09
Payer: MEDICARE

## 2025-04-09 PROCEDURE — 74183 MRI ABD W/O CNTR FLWD CNTR: CPT | Mod: 26

## 2025-04-15 ENCOUNTER — NON-APPOINTMENT (OUTPATIENT)
Age: 75
End: 2025-04-15

## 2025-08-15 ENCOUNTER — APPOINTMENT (OUTPATIENT)
Dept: OTOLARYNGOLOGY | Facility: CLINIC | Age: 75
End: 2025-08-15
Payer: MEDICARE

## 2025-08-15 DIAGNOSIS — R09.A2 FOREIGN BODY SENSATION, THROAT: ICD-10-CM

## 2025-08-15 DIAGNOSIS — R07.0 PAIN IN THROAT: ICD-10-CM

## 2025-08-15 DIAGNOSIS — K21.9 GASTRO-ESOPHAGEAL REFLUX DISEASE W/OUT ESOPHAGITIS: ICD-10-CM

## 2025-08-15 DIAGNOSIS — Z80.9 FAMILY HISTORY OF MALIGNANT NEOPLASM, UNSPECIFIED: ICD-10-CM

## 2025-08-15 PROCEDURE — 99213 OFFICE O/P EST LOW 20 MIN: CPT | Mod: 25

## 2025-08-15 PROCEDURE — 31575 DIAGNOSTIC LARYNGOSCOPY: CPT

## 2025-08-15 RX ORDER — OMEPRAZOLE 40 MG/1
CAPSULE, DELAYED RELEASE ORAL
Refills: 0 | Status: ACTIVE | COMMUNITY

## 2025-08-18 PROBLEM — K21.9 LPRD (LARYNGOPHARYNGEAL REFLUX DISEASE): Status: ACTIVE | Noted: 2025-08-18

## 2025-08-18 PROBLEM — R09.A2 GLOBUS PHARYNGEUS: Status: ACTIVE | Noted: 2025-08-18

## 2025-08-18 PROBLEM — R07.0 THROAT PAIN IN ADULT: Status: ACTIVE | Noted: 2025-08-18

## 2025-08-25 ENCOUNTER — APPOINTMENT (OUTPATIENT)
Dept: ORTHOPEDIC SURGERY | Facility: CLINIC | Age: 75
End: 2025-08-25
Payer: MEDICARE

## 2025-08-25 DIAGNOSIS — M53.3 SACROCOCCYGEAL DISORDERS, NOT ELSEWHERE CLASSIFIED: ICD-10-CM

## 2025-08-25 PROCEDURE — 99215 OFFICE O/P EST HI 40 MIN: CPT

## 2025-09-12 ENCOUNTER — APPOINTMENT (OUTPATIENT)
Dept: UROLOGY | Facility: CLINIC | Age: 75
End: 2025-09-12

## 2025-09-12 ENCOUNTER — APPOINTMENT (OUTPATIENT)
Dept: PHYSICAL MEDICINE AND REHAB | Facility: CLINIC | Age: 75
End: 2025-09-12

## 2025-09-12 VITALS
TEMPERATURE: 98.2 F | HEIGHT: 72 IN | DIASTOLIC BLOOD PRESSURE: 74 MMHG | BODY MASS INDEX: 21.81 KG/M2 | SYSTOLIC BLOOD PRESSURE: 128 MMHG | OXYGEN SATURATION: 97 % | HEART RATE: 75 BPM | WEIGHT: 161 LBS

## 2025-09-12 DIAGNOSIS — M54.59 OTHER LOW BACK PAIN: ICD-10-CM
